# Patient Record
Sex: MALE | Race: WHITE | NOT HISPANIC OR LATINO | Employment: FULL TIME | URBAN - METROPOLITAN AREA
[De-identification: names, ages, dates, MRNs, and addresses within clinical notes are randomized per-mention and may not be internally consistent; named-entity substitution may affect disease eponyms.]

---

## 2017-02-08 ENCOUNTER — HOSPITAL ENCOUNTER (OUTPATIENT)
Facility: MEDICAL CENTER | Age: 27
End: 2017-02-08
Payer: COMMERCIAL

## 2017-02-08 LAB
ANION GAP SERPL CALC-SCNC: 6 MMOL/L (ref 0–11.9)
BDY FAT % MEASURED: 6.8 %
BP DIAS: 70 MMHG
BP SYS: 110 MMHG
BUN SERPL-MCNC: 18 MG/DL (ref 8–22)
CALCIUM SERPL-MCNC: 9.4 MG/DL (ref 8.5–10.5)
CHLORIDE SERPL-SCNC: 105 MMOL/L (ref 96–112)
CHOLEST SERPL-MCNC: 177 MG/DL (ref 100–199)
CO2 SERPL-SCNC: 28 MMOL/L (ref 20–33)
CREAT SERPL-MCNC: 0.9 MG/DL (ref 0.5–1.4)
DIABETES HTDIA: NO
EVENT NAME HTEVT: NORMAL
GFR SERPL CREATININE-BSD FRML MDRD: >60 ML/MIN/1.73 M 2
GLUCOSE SERPL-MCNC: 85 MG/DL (ref 65–99)
HDLC SERPL-MCNC: 66 MG/DL
HYPERTENSION HTHYP: NO
LDLC SERPL CALC-MCNC: 99 MG/DL
POTASSIUM SERPL-SCNC: 4.3 MMOL/L (ref 3.6–5.5)
SCREENING LOC CITY HTCIT: NORMAL
SCREENING LOC STATE HTSTA: NORMAL
SCREENING LOCATION HTLOC: NORMAL
SODIUM SERPL-SCNC: 139 MMOL/L (ref 135–145)
SUBSCRIBER ID HTSID: NORMAL
TRIGL SERPL-MCNC: 60 MG/DL (ref 0–149)

## 2022-10-01 ENCOUNTER — HOSPITAL ENCOUNTER (EMERGENCY)
Facility: MEDICAL CENTER | Age: 32
End: 2022-10-01
Attending: EMERGENCY MEDICINE
Payer: COMMERCIAL

## 2022-10-01 ENCOUNTER — APPOINTMENT (OUTPATIENT)
Dept: RADIOLOGY | Facility: MEDICAL CENTER | Age: 32
End: 2022-10-01
Attending: EMERGENCY MEDICINE
Payer: COMMERCIAL

## 2022-10-01 VITALS
HEART RATE: 66 BPM | BODY MASS INDEX: 21.85 KG/M2 | TEMPERATURE: 97.4 F | SYSTOLIC BLOOD PRESSURE: 119 MMHG | WEIGHT: 175.71 LBS | OXYGEN SATURATION: 97 % | DIASTOLIC BLOOD PRESSURE: 59 MMHG | HEIGHT: 75 IN | RESPIRATION RATE: 16 BRPM

## 2022-10-01 DIAGNOSIS — S62.327A CLOSED DISPLACED FRACTURE OF SHAFT OF FIFTH METACARPAL BONE OF LEFT HAND, INITIAL ENCOUNTER: ICD-10-CM

## 2022-10-01 PROCEDURE — 99283 EMERGENCY DEPT VISIT LOW MDM: CPT

## 2022-10-01 PROCEDURE — 302874 HCHG BANDAGE ACE 2 OR 3""

## 2022-10-01 PROCEDURE — 73130 X-RAY EXAM OF HAND: CPT | Mod: LT

## 2022-10-01 PROCEDURE — 29125 APPL SHORT ARM SPLINT STATIC: CPT

## 2022-10-01 NOTE — ED TRIAGE NOTES
"Chief Complaint   Patient presents with    Hand Injury     Pt states he broke his hand on 9/20 while working on his car. He states he didn't have insurance until today and waited to seek care until today. He is worried the fracture is note healing appropriately as the area is still swollen and \"looks off compared to my L hand.\" CMS intact, swelling noted to lateral aspect of L anterior hand       Ambulatory  to triage for above complaint.   Educated on triage process, encourage to inform staff of any changes.     /62   Pulse 67   Temp 36.2 °C (97.1 °F) (Temporal)   Resp 16   Ht 1.905 m (6' 3\")   Wt 79.7 kg (175 lb 11.3 oz)   SpO2 95%   BMI 21.96 kg/m²     "

## 2022-10-01 NOTE — ED PROVIDER NOTES
"ED Provider Note    CHIEF COMPLAINT  Chief Complaint   Patient presents with    Hand Injury     Pt states he broke his hand on 9/20 while working on his car. He states he didn't have insurance until today and waited to seek care until today. He is worried the fracture is note healing appropriately as the area is still swollen and \"looks off compared to my L hand.\" CMS intact, swelling noted to lateral aspect of L anterior hand       HPI  Meng Moses is a 31 y.o. male who presents for evaluation of hand injury.  The patient states that he was working on a car when the bolt snapped and his hand struck the area inside the engine.  The patient has had a previous metacarpal fracture and thought this was the case.  Due to lack of insurance he waited.  He now presents for evaluation.  He denies any other injuries other than pain to the left hand.    REVIEW OF SYSTEMS  See HPI for further details.  No major health problems such as hypertension, cardiopulmonary disorders, gastrointestinal disorders.    PAST MEDICAL HISTORY  1.  Asthma    FAMILY HISTORY  No family history on file.    SOCIAL HISTORY  Patient works as a     SURGICAL HISTORY  No past surgical history on file.    CURRENT MEDICATIONS  Home Medications       Reviewed by Nuha Andrews R.N. (Registered Nurse) on 10/01/22 at 1617  Med List Status: Not Addressed     Medication Last Dose Status   albuterol (VENTOLIN OR PROVENTIL) 108 (90 BASE) MCG/ACT AERS  Active                    ALLERGIES  No Known Allergies    PHYSICAL EXAM  VITAL SIGNS: /62   Pulse 67   Temp 36.2 °C (97.1 °F) (Temporal)   Resp 16   Ht 1.905 m (6' 3\")   Wt 79.7 kg (175 lb 11.3 oz)   SpO2 95%   BMI 21.96 kg/m²    Constitutional: 31-year-old male, well developed, Well nourished, No acute distress, Non-toxic appearance.   HENT: Normocephalic, Atraumatic  Cardiovascular: Regular rate and rhythm without mumurs, gallups, rubs   Thorax & Lungs: Normal Equal breath sounds, " No respiratory distress, No wheezing, no stridor, no rales. No chest tenderness.   Musculoskeletal: Left upper extremity is atraumatic set for the left hand; left hand reveals some swelling and tenderness over the ulnar aspect of the hand; extensor and flexor tendon mechanisms intact; motor, sensory, vascular distally;  neurologic: Alert & oriented x 3,  No gross focal deficits noted.   Psychiatric: Affect normal, Judgment normal, Mood normal.       RADIOLOGY/PROCEDURES  DX-HAND 3+ LEFT   Final Result      1.  Mildly comminuted but not significantly displaced fracture of the left 5th metacarpal diaphysis.            COURSE & MEDICAL DECISION MAKING  Pertinent Labs & Imaging studies reviewed. (See chart for details)  Discussion: At this time, the patient presents 10 days after his injury.  The patient has a fracture of the left fifth metacarpal with some mild angulation and mild displacement.  Patient was placed in an ulnar gutter OCL splint.  The patient was instructed to follow-up with orthopedics/hand surgery on-call, Dr. Neumann.  I discussed the findings and treatment plan with the patient.  He indicates he is comfortable with this explanation disposition.    FINAL IMPRESSION  1. Closed displaced fracture of shaft of fifth metacarpal bone of left hand, initial encounter           PLAN  1.  Appropriate discharge instructions given  2.  Follow-up with orthopedic surgery    Electronically signed by: Guy G Gansert, M.D., 10/1/2022 4:35 PM

## 2022-10-02 NOTE — ED NOTES
Pt provided verbal and written dc instructions, vss, no piv, hand splinted by tech, pt ambulated out of ed independently.